# Patient Record
Sex: FEMALE | Race: BLACK OR AFRICAN AMERICAN | HISPANIC OR LATINO | URBAN - METROPOLITAN AREA
[De-identification: names, ages, dates, MRNs, and addresses within clinical notes are randomized per-mention and may not be internally consistent; named-entity substitution may affect disease eponyms.]

---

## 2019-08-13 ENCOUNTER — EMERGENCY (EMERGENCY)
Facility: HOSPITAL | Age: 49
LOS: 1 days | Discharge: HOME | End: 2019-08-13
Admitting: EMERGENCY MEDICINE
Payer: MEDICAID

## 2019-08-13 VITALS
HEART RATE: 71 BPM | TEMPERATURE: 98 F | OXYGEN SATURATION: 100 % | RESPIRATION RATE: 18 BRPM | DIASTOLIC BLOOD PRESSURE: 94 MMHG | WEIGHT: 132.06 LBS | SYSTOLIC BLOOD PRESSURE: 144 MMHG | HEIGHT: 61 IN

## 2019-08-13 PROCEDURE — 73630 X-RAY EXAM OF FOOT: CPT | Mod: 26,LT

## 2019-08-13 PROCEDURE — 73630 X-RAY EXAM OF FOOT: CPT | Mod: 26

## 2019-08-13 PROCEDURE — 73610 X-RAY EXAM OF ANKLE: CPT

## 2019-08-13 PROCEDURE — 73630 X-RAY EXAM OF FOOT: CPT

## 2019-08-13 PROCEDURE — 99283 EMERGENCY DEPT VISIT LOW MDM: CPT | Mod: 25

## 2019-08-13 PROCEDURE — 73610 X-RAY EXAM OF ANKLE: CPT | Mod: 26,LT

## 2019-08-13 PROCEDURE — 73610 X-RAY EXAM OF ANKLE: CPT | Mod: 26

## 2019-08-13 PROCEDURE — 99284 EMERGENCY DEPT VISIT MOD MDM: CPT

## 2019-08-13 RX ADMIN — Medication 500 MILLIGRAM(S): at 21:07

## 2019-08-13 RX ADMIN — Medication 500 MILLIGRAM(S): at 20:36

## 2019-08-13 NOTE — ED ADULT NURSE NOTE - NSIMPLEMENTINTERV_GEN_ALL_ED
Implemented All Fall Risk Interventions:  [employer] to call system. Call bell, personal items and telephone within reach. Instruct patient to call for assistance. Room bathroom lighting operational. Non-slip footwear when patient is off stretcher. Physically safe environment: no spills, clutter or unnecessary equipment. Stretcher in lowest position, wheels locked, appropriate side rails in place. Provide visual cue, wrist band, yellow gown, etc. Monitor gait and stability. Monitor for mental status changes and reorient to person, place, and time. Review medications for side effects contributing to fall risk. Reinforce activity limits and safety measures with patient and family.

## 2019-08-13 NOTE — ED PROVIDER NOTE - PHYSICAL EXAMINATION
General Appearance: Patient is well developed and well nourished. Patient is lying in stretcher, not diaphoretic and does not appear in acute distress.      Pulm: Chest expansion symmetric bilaterally without evidence of retraction.       MSK: ttp left lateral good. No noted tenderness to palpation of the medial/lateral malleolous, anterior joint. No tenderness to palpation of the tarsals, metatarsals or phalanges of the foot. No evidence of ecchymosis, deformity or edema. Full ROM ankle flexion, extension, inversion and eversion. Full ROM hip and knee.      Neuro: Distal sensation intact in arms and legs bilaterally. Sensory Knee and ankle reflexes 2+ and symmetric bilaterally. gait steady. gcs 15    psych: mood and affect appropriate.     skin: warm dry and intact- no note of erythema edema purpura petechia ecchymosis

## 2019-08-13 NOTE — ED PROVIDER NOTE - DIAGNOSTIC INTERPRETATION
ER Physician Assistant:left ankle/foot (reviewed with radiology)  INTERPRETATION: no acute fracture; no soft tissue swelling noted; normal bony alignment.

## 2019-08-13 NOTE — ED ADULT NURSE NOTE - OBJECTIVE STATEMENT
s/p mechanical trip and fall while at work, twisted left ankle, no head injury or LOC, no obvious deformity. swelling noted.  Ice pack applied in ED.

## 2019-08-13 NOTE — ED PROVIDER NOTE - OBJECTIVE STATEMENT
pt is Hebrew speaking and  services provided. states that she was walking down steps and twisted her left ankle. no head injury or loc. states that she has painin the ankle and pain in the foot. no numbness tingling or weakenss, unable to walk. this has never happened to her before. no pain or injury elsewhere. pt is [place]ish speaking and  services provided. states that she was walking down steps and twisted her left ankle. no head injury or loc. states that she has pain in the ankle and pain in the foot. no numbness tingling or weakness, unable to walk. this has never happened to her before. no pain or injury elsewhere.

## 2019-08-13 NOTE — ED ADULT TRIAGE NOTE - CHIEF COMPLAINT QUOTE
patient BIBA from work s/p trip and fall complains of left ankle pain. no obvious deformity. denies neck or back pain did not hit head

## 2019-08-13 NOTE — ED PROVIDER NOTE - CLINICAL SUMMARY MEDICAL DECISION MAKING FREE TEXT BOX
This is a pleasant 49 year old female presenting to the ed with left ankle pain. slipped on last step earlier this evening.no head injury or loc. physical exam, patient apepars well, non-toxic. ttp inferior to the left lateral malleolus. patietn sent for xr. This is a pleasant 49 year old female presenting to the ed with left ankle pain. pt is [place]ian speaking and has  at bedside to translate- politely declines  service. slipped on last step earlier this evening. no head injury or loc. physical exam, patient appears well, non-toxic. ttp inferior to the left lateral malleolus. patient sent for xr. Per my interpretation, imaging negative for fracture. Patient understands that they may be contacted when once reviewed by radiologist if alternative read. Patient is immobilized with splint and recommend rest, ice elevation and encouraged to take tylenol and motrin for pain and follow up with orthopedics provided in discharge paperwork for continuing or worsening of symptoms. encouraged to use crutches and remain non-weight bearing until tolerated. Patient understands that they may need to follow up with an orthopedist and may need an MRI for further evaluation. Patient is advised to return if any worsening of symptoms. Patient understands indications to return to the ER. Patient is agreeable with this plan. ED evaluation and management discussed with the patient and family (if available) in detail.  Close PMD follow up encouraged.  Strict ED return instructions discussed in detail and patient given the opportunity to ask any questions about their discharge diagnosis and instructions. Patient verbalized understanding. Patient is agreeable to plan.
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
## 2019-08-17 DIAGNOSIS — S93.402A SPRAIN OF UNSPECIFIED LIGAMENT OF LEFT ANKLE, INITIAL ENCOUNTER: ICD-10-CM
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
## 2019-08-17 DIAGNOSIS — M25.572 PAIN IN LEFT ANKLE AND JOINTS OF LEFT FOOT: ICD-10-CM
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
## 2021-12-07 NOTE — ED ADULT TRIAGE NOTE - HEIGHT IN FEET
DISCHARGE INSTRUCTIONS     As part of your transition home, we are providing you with this set of discharge instructions, as a guide to help you in that process. In addition to the care provided during your hospital stay, there may be upcoming clinic visits, laboratory appointments, and/or results noted on this After Visit Summary (AVS).     To assist the physicians caring for you during this transition, we would like you remind you of the followin. Please call a Provider for help if you experience any of the following: Any questions or concerns  2. Activity Instructions: Normal activity as tolerated  3. Dressing/Wound Instructions:   R knee wound stable and healing but remains with other wounds that are large     R ischium is deep but per imaging improved   Sacrum is now S/P debridement on 21  Heel and right ankle with continued pressure changes  Scrotum and buttocks with continued breakdown.      Wounds remain challenging to take care of due to his developmental delay and immobility as well as incontinence     Local Wound Care:  - Clean wounds with mild soap and water.  - R ischium and sacrum : dakins/vashe moist packing. Cover with dry secondary dressing. Change daily and PRN  - Hydrogel right knee wound, cover with dry dressing daily.  - calazime to scrotum and coccxy.  4. Lifting & Weight-bearing Instructions: No restrictions  5. Diet: Return to previous diet  6. Miscellaneous: follow up with your usual providers for refills on prescriptions provided to you this hospitalization.      If you have any questions 24-48 hours after discharge, please feel free to contact the hospital unit/department you were discharged from.   
5